# Patient Record
Sex: FEMALE | Race: WHITE | HISPANIC OR LATINO | Employment: FULL TIME | URBAN - NONMETROPOLITAN AREA
[De-identification: names, ages, dates, MRNs, and addresses within clinical notes are randomized per-mention and may not be internally consistent; named-entity substitution may affect disease eponyms.]

---

## 2019-07-16 ENCOUNTER — OFFICE VISIT (OUTPATIENT)
Dept: MEDICAL GROUP | Facility: CLINIC | Age: 24
End: 2019-07-16
Payer: COMMERCIAL

## 2019-07-16 VITALS
BODY MASS INDEX: 17.42 KG/M2 | HEIGHT: 67 IN | OXYGEN SATURATION: 97 % | WEIGHT: 111 LBS | HEART RATE: 74 BPM | SYSTOLIC BLOOD PRESSURE: 100 MMHG | TEMPERATURE: 98.2 F | DIASTOLIC BLOOD PRESSURE: 60 MMHG

## 2019-07-16 DIAGNOSIS — Z30.09 BIRTH CONTROL COUNSELING: ICD-10-CM

## 2019-07-16 DIAGNOSIS — L70.0 CYSTIC ACNE: ICD-10-CM

## 2019-07-16 DIAGNOSIS — Z00.00 ENCOUNTER FOR MEDICAL EXAMINATION TO ESTABLISH CARE: ICD-10-CM

## 2019-07-16 DIAGNOSIS — Z23 NEED FOR VACCINATION: ICD-10-CM

## 2019-07-16 PROCEDURE — 90471 IMMUNIZATION ADMIN: CPT | Performed by: PHYSICIAN ASSISTANT

## 2019-07-16 PROCEDURE — 90715 TDAP VACCINE 7 YRS/> IM: CPT | Performed by: PHYSICIAN ASSISTANT

## 2019-07-16 PROCEDURE — 99203 OFFICE O/P NEW LOW 30 MIN: CPT | Mod: 25 | Performed by: PHYSICIAN ASSISTANT

## 2019-07-16 ASSESSMENT — PATIENT HEALTH QUESTIONNAIRE - PHQ9: CLINICAL INTERPRETATION OF PHQ2 SCORE: 0

## 2019-07-16 NOTE — ASSESSMENT & PLAN NOTE
She is due for TDaP and Trumenba, but only TDaP is available at this clinic. She states she is not likely to seek Trumenba on her own as she doesn't like vaccines.

## 2019-07-16 NOTE — PROGRESS NOTES
Chief Complaint   Patient presents with   • Immunizations     per college pt needs Tdap   • Establish Care       HISTORY OF THE PRESENT ILLNESS: This is a 23 y.o. female new patient to our practice who presents today for evaluation and management of:    Encounter for medical examination to establish care  Patient has been informed by her school that she is due for vaccines so wishes to establish care today. She denies any other concerns.     Cystic acne  This is present but patient is not concerned about this.     Birth control counseling  Patinet given ideas and options for birth control including methods other than condoms that can be used in addition to condoms that can help regulate cycles, prevent acne. She states she is worried that hormones will cause mood issues and obesity. She will discuss this with her provider in [place] as this is where she obtains most of her medical care.     Need for vaccination  She is due for TDaP and Trumenba, but only TDaP is available at this clinic. She states she is not likely to seek Trumenba on her own as she doesn't like vaccines.       History reviewed. No pertinent past medical history.    History reviewed. No pertinent surgical history.    Family Status   Relation Status   • Mo Alive   • Fa Alive   • MGFa Alive   • PGFa Alive     Family History   Problem Relation Age of Onset   • Diabetes Maternal Grandfather    • Cancer Paternal Grandfather         prostate       Social History   Substance Use Topics   • Smoking status: Never Smoker   • Smokeless tobacco: Never Used   • Alcohol use Not on file      Comment: one or two drinks per year       Allergies: Patient has no known allergies.    No current Epic-ordered outpatient prescriptions on file.     No current Epic-ordered facility-administered medications on file.      Review of Systems: See HPI above.   Constitutional: Negative for fever, chills, unplanned weight change and malaise/fatigue.   HENT: Negative for ear pain or  "tinnitus, nosebleeds, congestion, sore throat and neck pain.    Eyes: Negative for blurred or decreased vision.   Respiratory: Negative for cough, sputum production, shortness of breath and wheezing.    Cardiovascular: Negative for chest pain, palpitations, orthopnea, syncope and leg swelling.   Gastrointestinal: Negative for heartburn, nausea, vomiting and abdominal pain.   Genitourinary: Negative for dysuria, urgency and frequency.   Musculoskeletal: Negative for myalgias, back pain and joint pain.   Skin: Negative for rash, itching, nail changes or skin changes. Positive for acne.   Neurological: Negative for dizziness, tremors, sensory change, focal weakness, tingling and headaches.   Endo/Heme/Allergies: Does not bruise/bleed easily.    Psychiatric/Behavioral: Negative for depression, suicidal ideas and memory loss. The patient is not nervous/anxious and does not have insomnia.  Pt does not use recreational drugs or excessive alcohol.       Exam:  /60 (BP Location: Left arm, Patient Position: Sitting, BP Cuff Size: Adult)   Pulse 74   Temp 36.8 °C (98.2 °F) (Temporal)   Ht 1.702 m (5' 7\")   Wt 50.3 kg (111 lb)   SpO2 97%   Body mass index is 17.39 kg/m².  General:  Thin female in NAD  Eyes: Conjunctiva clear, lids without ptosis  ENMT: Nose and lips without deformity.  Neck: Thyroid is not visibly enlarged.  Pulmonary: Normal effort. No rales, ronchi, or wheezing upon auscultation.   Cardiovascular: Regular rate and rhythm without murmur. Carotid and radial pulses are intact and equal bilaterally.   Extremities: No clubbing or cyanosis. Bilateral upper and lower extremities without edema.   Skin: Warm and dry.  No obvious lesions.  Musculoskeletal: Normal gait.   Psych: Normal mood and affect. Alert and oriented x3. Judgment and insight is normal.      Medical Decision Making & Discussion:   1. Need for vaccination  - Tdap =>6yo IM    2. Encounter for medical examination to establish care  I am happy " to participate in the care of this pleasant 23 year old Woman.       3. Cystic acne      4. Birth control counseling  Offered and refused this today. Also discussed need for PAP smear at the age of 23 to be completed as soon as possible. Patient states she will have this done in [place].       Please note that this dictation was created using voice recognition software. I have made every reasonable attempt to correct obvious errors, but I expect that there are errors of grammar and possibly content that I did not discover before finalizing the note.    Return for as needed. .

## 2019-07-16 NOTE — ASSESSMENT & PLAN NOTE
Patient has been informed by her school that she is due for vaccines so wishes to establish care today. She denies any other concerns.

## 2019-07-16 NOTE — ASSESSMENT & PLAN NOTE
Jeffrey given ideas and options for birth control including methods other than condoms that can be used in addition to condoms that can help regulate cycles, prevent acne. She states she is worried that hormones will cause mood issues and obesity. She will discuss this with her provider in [place] as this is where she obtains most of her medical care.

## 2019-08-22 ENCOUNTER — TELEPHONE (OUTPATIENT)
Dept: MEDICAL GROUP | Facility: CLINIC | Age: 24
End: 2019-08-22

## 2019-08-22 NOTE — TELEPHONE ENCOUNTER
Patient came in to get their immunization record today. Record was pulled from [facility] scanned into media

## 2020-07-14 ENCOUNTER — HOSPITAL ENCOUNTER (EMERGENCY)
Facility: MEDICAL CENTER | Age: 25
End: 2020-07-14
Attending: EMERGENCY MEDICINE
Payer: COMMERCIAL

## 2020-07-14 VITALS
BODY MASS INDEX: 17.27 KG/M2 | HEART RATE: 109 BPM | RESPIRATION RATE: 18 BRPM | SYSTOLIC BLOOD PRESSURE: 113 MMHG | TEMPERATURE: 99.4 F | DIASTOLIC BLOOD PRESSURE: 64 MMHG | OXYGEN SATURATION: 98 % | WEIGHT: 110 LBS | HEIGHT: 67 IN

## 2020-07-14 DIAGNOSIS — R55 VASOVAGAL SYNCOPE: ICD-10-CM

## 2020-07-14 LAB
ALBUMIN SERPL BCP-MCNC: 4.5 G/DL (ref 3.2–4.9)
ALBUMIN/GLOB SERPL: 1.4 G/DL
ALP SERPL-CCNC: 49 U/L (ref 30–99)
ALT SERPL-CCNC: 10 U/L (ref 2–50)
ANION GAP SERPL CALC-SCNC: 11 MMOL/L (ref 7–16)
AST SERPL-CCNC: 18 U/L (ref 12–45)
BASOPHILS # BLD AUTO: 0.5 % (ref 0–1.8)
BASOPHILS # BLD: 0.06 K/UL (ref 0–0.12)
BILIRUB SERPL-MCNC: 0.3 MG/DL (ref 0.1–1.5)
BUN SERPL-MCNC: 14 MG/DL (ref 8–22)
CALCIUM SERPL-MCNC: 9.3 MG/DL (ref 8.5–10.5)
CHLORIDE SERPL-SCNC: 102 MMOL/L (ref 96–112)
CO2 SERPL-SCNC: 22 MMOL/L (ref 20–33)
CREAT SERPL-MCNC: 0.65 MG/DL (ref 0.5–1.4)
D DIMER PPP IA.FEU-MCNC: 0.3 UG/ML (FEU) (ref 0–0.5)
EKG IMPRESSION: NORMAL
EOSINOPHIL # BLD AUTO: 0.1 K/UL (ref 0–0.51)
EOSINOPHIL NFR BLD: 0.8 % (ref 0–6.9)
ERYTHROCYTE [DISTWIDTH] IN BLOOD BY AUTOMATED COUNT: 39 FL (ref 35.9–50)
GLOBULIN SER CALC-MCNC: 3.3 G/DL (ref 1.9–3.5)
GLUCOSE SERPL-MCNC: 152 MG/DL (ref 65–99)
HCG SERPL QL: NEGATIVE
HCT VFR BLD AUTO: 40.8 % (ref 37–47)
HGB BLD-MCNC: 13.4 G/DL (ref 12–16)
IMM GRANULOCYTES # BLD AUTO: 0.06 K/UL (ref 0–0.11)
IMM GRANULOCYTES NFR BLD AUTO: 0.5 % (ref 0–0.9)
LYMPHOCYTES # BLD AUTO: 1.37 K/UL (ref 1–4.8)
LYMPHOCYTES NFR BLD: 10.6 % (ref 22–41)
MCH RBC QN AUTO: 26.7 PG (ref 27–33)
MCHC RBC AUTO-ENTMCNC: 32.8 G/DL (ref 33.6–35)
MCV RBC AUTO: 81.3 FL (ref 81.4–97.8)
MONOCYTES # BLD AUTO: 0.67 K/UL (ref 0–0.85)
MONOCYTES NFR BLD AUTO: 5.2 % (ref 0–13.4)
NEUTROPHILS # BLD AUTO: 10.71 K/UL (ref 2–7.15)
NEUTROPHILS NFR BLD: 82.4 % (ref 44–72)
NRBC # BLD AUTO: 0 K/UL
NRBC BLD-RTO: 0 /100 WBC
PLATELET # BLD AUTO: 255 K/UL (ref 164–446)
PMV BLD AUTO: 11.6 FL (ref 9–12.9)
POTASSIUM SERPL-SCNC: 3.7 MMOL/L (ref 3.6–5.5)
PROT SERPL-MCNC: 7.8 G/DL (ref 6–8.2)
RBC # BLD AUTO: 5.02 M/UL (ref 4.2–5.4)
SODIUM SERPL-SCNC: 135 MMOL/L (ref 135–145)
WBC # BLD AUTO: 13 K/UL (ref 4.8–10.8)

## 2020-07-14 PROCEDURE — 84703 CHORIONIC GONADOTROPIN ASSAY: CPT

## 2020-07-14 PROCEDURE — 85379 FIBRIN DEGRADATION QUANT: CPT

## 2020-07-14 PROCEDURE — 85025 COMPLETE CBC W/AUTO DIFF WBC: CPT

## 2020-07-14 PROCEDURE — 99284 EMERGENCY DEPT VISIT MOD MDM: CPT

## 2020-07-14 PROCEDURE — 93005 ELECTROCARDIOGRAM TRACING: CPT | Performed by: EMERGENCY MEDICINE

## 2020-07-14 PROCEDURE — 80053 COMPREHEN METABOLIC PANEL: CPT

## 2020-07-14 ASSESSMENT — LIFESTYLE VARIABLES: DO YOU DRINK ALCOHOL: NO

## 2020-07-14 NOTE — ED TRIAGE NOTES
Chief Complaint   Patient presents with   • Syncope   • Dizziness     Pt bib ems , pt had syncopal episode at work. Pt said that she felt dizzy prior to passing out. She also reports that had similar episode before due to dehydration.  fsbs 147, a/ox4. Denies head,neck or back pain.

## 2020-07-14 NOTE — ED NOTES
ERP to bedside to discuss test results.     Pt A&Ox4, given discharge instructions. Discussed follow-up, diet, activity, symptoms and management. Pt educated on remaining hydrated. Pt verbalized understanding of all discharge instructions. All questions answered. IV d/c'd. Pt ambulated off unit.

## 2020-07-14 NOTE — ED PROVIDER NOTES
"ED Provider Note    Scribed for Luiz Hayes M.D. by Baljinder Nickerson. 7/14/2020  4:04 PM    Primary care provider: Brenda Kumar P.A.-C.  Means of arrival: EMS  History obtained from: Patient  History limited by: None    CHIEF COMPLAINT  Chief Complaint   Patient presents with   • Syncope   • Dizziness       HPI  Deepa Mccarty is a 24 y.o. female who presents to the Emergency Department after experiencing a syncopal episode earlier today. She states that she was at work talking to a few co-workers and had been standing for multiple hours when she started to feel lightheaded. Upon this development she sat down, however shortly after started to feel nauseous, and then lost consciousness. She is unsure how long she was unconscious for. EMS was called and she was brought here for evaluation. At this time her nausea has resolved, but she verbalizes that she feels like she's \"still in a dream.\" Patient claims to be eating and drinking normally, and notes that she is currently on her menstrual period. She has previously had a similar episode of syncope which was determined to be secondary to dehydration. Patient otherwise denies having any fevers, chills, chest pain, abdominal pain, shortness of breath, nausea, vomiting.    REVIEW OF SYSTEMS  Pertinent positives include lightheadedness, nausea, syncope. Pertinent negatives include no fevers, chills, chest pain, abdominal pain, shortness of breath, nausea, vomiting.  All other systems reviewed and negative.    PAST MEDICAL HISTORY  Patient denies any past medical problems or history.    SURGICAL HISTORY  patient denies any surgical history    SOCIAL HISTORY  Social History     Tobacco Use   • Smoking status: Never Smoker   • Smokeless tobacco: Never Used   Substance Use Topics   • Alcohol use: Not on file     Comment: one or two drinks per year   • Drug use: Not on file      Social History     Substance and Sexual Activity   Drug Use Not on file       FAMILY " "HISTORY  Family History   Problem Relation Age of Onset   • Diabetes Maternal Grandfather    • Cancer Paternal Grandfather         prostate       CURRENT MEDICATIONS  Home Medications     Reviewed by Christina Don R.N. (Registered Nurse) on 07/14/20 at 1533  Med List Status: Complete   Medication Last Dose Status        Patient Anselmo Taking any Medications                       ALLERGIES  No Known Allergies    PHYSICAL EXAM  VITAL SIGNS: /66   Pulse 99   Temp 37.2 °C (99 °F) (Temporal)   Resp 20   Ht 1.702 m (5' 7\")   Wt 49.9 kg (110 lb)   SpO2 97%   BMI 17.23 kg/m²     Constitutional: Well developed, Well nourished, no apparent distress, Non-toxic appearance.   HENT: Normocephalic, Atraumatic, Bilateral external ears normal, Oropharynx moist, No oral exudates.   Eyes: PERRLA, EOMI, Conjunctiva normal, No discharge.   Neck: No tenderness, Supple, No stridor.   Lymphatic: No lymphadenopathy noted.   Cardiovascular: Normal heart rate, Normal rhythm.   Thorax & Lungs: Clear to auscultation bilaterally, No respiratory distress, No wheezing, No crackles.   Abdomen: Soft, No tenderness, No masses, No pulsatile masses.   Skin: Warm, Dry, No erythema, No rash.   Extremities:, No edema No cyanosis.   Musculoskeletal: No tenderness to palpation or major deformities noted.  Intact distal pulses  Neurologic: Awake, alert. Moves all extremities spontaneously.  Psychiatric: Slightly anxious    LABS  Results for orders placed or performed during the hospital encounter of 07/14/20   HCG Qual Serum   Result Value Ref Range    Beta-Hcg Qualitative Serum Negative Negative   CBC w/ Differential   Result Value Ref Range    WBC 13.0 (H) 4.8 - 10.8 K/uL    RBC 5.02 4.20 - 5.40 M/uL    Hemoglobin 13.4 12.0 - 16.0 g/dL    Hematocrit 40.8 37.0 - 47.0 %    MCV 81.3 (L) 81.4 - 97.8 fL    MCH 26.7 (L) 27.0 - 33.0 pg    MCHC 32.8 (L) 33.6 - 35.0 g/dL    RDW 39.0 35.9 - 50.0 fL    Platelet Count 255 164 - 446 K/uL    MPV 11.6 9.0 " - 12.9 fL    Neutrophils-Polys 82.40 (H) 44.00 - 72.00 %    Lymphocytes 10.60 (L) 22.00 - 41.00 %    Monocytes 5.20 0.00 - 13.40 %    Eosinophils 0.80 0.00 - 6.90 %    Basophils 0.50 0.00 - 1.80 %    Immature Granulocytes 0.50 0.00 - 0.90 %    Nucleated RBC 0.00 /100 WBC    Neutrophils (Absolute) 10.71 (H) 2.00 - 7.15 K/uL    Lymphs (Absolute) 1.37 1.00 - 4.80 K/uL    Monos (Absolute) 0.67 0.00 - 0.85 K/uL    Eos (Absolute) 0.10 0.00 - 0.51 K/uL    Baso (Absolute) 0.06 0.00 - 0.12 K/uL    Immature Granulocytes (abs) 0.06 0.00 - 0.11 K/uL    NRBC (Absolute) 0.00 K/uL   Complete Metabolic Panel (CMP)   Result Value Ref Range    Sodium 135 135 - 145 mmol/L    Potassium 3.7 3.6 - 5.5 mmol/L    Chloride 102 96 - 112 mmol/L    Co2 22 20 - 33 mmol/L    Anion Gap 11.0 7.0 - 16.0    Glucose 152 (H) 65 - 99 mg/dL    Bun 14 8 - 22 mg/dL    Creatinine 0.65 0.50 - 1.40 mg/dL    Calcium 9.3 8.5 - 10.5 mg/dL    AST(SGOT) 18 12 - 45 U/L    ALT(SGPT) 10 2 - 50 U/L    Alkaline Phosphatase 49 30 - 99 U/L    Total Bilirubin 0.3 0.1 - 1.5 mg/dL    Albumin 4.5 3.2 - 4.9 g/dL    Total Protein 7.8 6.0 - 8.2 g/dL    Globulin 3.3 1.9 - 3.5 g/dL    A-G Ratio 1.4 g/dL   D-Dimer (only helpful in low pre-test probability wells critieria. Do not order if patient ruled out by PERC criteria. See Weblinks at top of Labs section)   Result Value Ref Range    D-Dimer Screen 0.30 0.00 - 0.50 ug/mL (FEU)   ESTIMATED GFR   Result Value Ref Range    GFR If African American >60 >60 mL/min/1.73 m 2    GFR If Non African American >60 >60 mL/min/1.73 m 2   EKG   Result Value Ref Range    Report       [facility] Emergency Dept.    Test Date:  2020  Pt Name:    CIRILO HAMMER              Department: ER  MRN:        [id number]                      Room:        22  Gender:     Female                       Technician: [id number]  :        1995                   Requested By:ER TRIAGE PROTOCOL  Order #:    [id number]                     Reading MD: AUSTIN BERNABE MD    Measurements  Intervals                                Axis  Rate:       96                           P:          76  TN:         144                          QRS:        67  QRSD:       78                           T:          39  QT:         360  QTc:        455    Interpretive Statements  SINUS RHYTHM  RIGHT ATRIAL ABNORMALITY  No previous ECG available for comparison  Electronically Signed On 7- 16:39:48 PDT by AUSTIN BERNABE MD          RADIOLOGY  No orders to display     The radiologist's interpretation of all radiological studies have been reviewed by me.      COURSE & MEDICAL DECISION MAKING  Pertinent Labs & Imaging studies reviewed. (See chart for details)    4:04 PM - Patient seen and examined at bedside. Ordered CBC with differential, CMP, D-Dimer, HCG Qual Serum, Estimated GFR to evaluate her symptoms. The differential diagnoses include but are not limited to: vasovagal syncope, PE. Discussed with the patietn that her EKG is reassuring, and she likely experienced and episode of vasovagal syncope, however because her heart rate was significantly elevated when she arrived I have to screen her for a blood clot as a possible cause of her syncope. She will be informed of the results when they return. Patient understands and agrees to the plan of care.    4:56 PM - Discussed with the patient that her blood clot screening was negative and thus she is stable for discharge. Answered any questions or concerns the patient had. She understands and agrees to discharge.    Decision Making:  Patient with a syncopal episode, likely vasovagal in nature, the patient was ruled out for a PE by negative d-dimer, EKG was negative, believe the patient can be discharged home, follow-up with the primary care physician, have the patient return with any other concerns.    The patient will return for new or worsening symptoms and is stable at the time of  discharge.    DISPOSITION:  Patient will be discharged home in stable condition.    FOLLOW UP:  [facility], Emergency Dept  [address] [facility] [address] [phone]    If symptoms worsen    Brenda Kumar P.A.-C.  [address]  [facility] [id number] [phone]            OUTPATIENT MEDICATIONS:  There are no discharge medications for this patient.        FINAL IMPRESSION  1. Vasovagal syncope          IBaljinder (Scribe), am scribing for, and in the presence of, Luiz Hayes M.D..    Electronically signed by: Baljinder Nickerson (Pavanibe), 7/14/2020    Luiz PANDYA M.D. personally performed the services described in this documentation, as scribed by Baljinder Nickerson in my presence, and it is both accurate and complete. C.    The note accurately reflects work and decisions made by me.  Luiz Hayes M.D.  7/14/2020  6:12 PM

## 2020-09-29 ENCOUNTER — OFFICE VISIT (OUTPATIENT)
Dept: URGENT CARE | Facility: PHYSICIAN GROUP | Age: 25
End: 2020-09-29
Payer: COMMERCIAL

## 2020-09-29 ENCOUNTER — HOSPITAL ENCOUNTER (OUTPATIENT)
Facility: MEDICAL CENTER | Age: 25
End: 2020-09-29
Attending: PHYSICIAN ASSISTANT
Payer: COMMERCIAL

## 2020-09-29 VITALS
BODY MASS INDEX: 17.11 KG/M2 | HEART RATE: 100 BPM | DIASTOLIC BLOOD PRESSURE: 64 MMHG | RESPIRATION RATE: 16 BRPM | HEIGHT: 67 IN | WEIGHT: 109 LBS | SYSTOLIC BLOOD PRESSURE: 102 MMHG | OXYGEN SATURATION: 99 % | TEMPERATURE: 98.3 F

## 2020-09-29 DIAGNOSIS — R50.9 FEVER, UNSPECIFIED FEVER CAUSE: ICD-10-CM

## 2020-09-29 DIAGNOSIS — R21 RASH: ICD-10-CM

## 2020-09-29 LAB
COVID ORDER STATUS COVID19: NORMAL
FLUAV+FLUBV AG SPEC QL IA: NEGATIVE
INT CON NEG: NORMAL
INT CON POS: NORMAL

## 2020-09-29 PROCEDURE — 87804 INFLUENZA ASSAY W/OPTIC: CPT | Mod: CS | Performed by: PHYSICIAN ASSISTANT

## 2020-09-29 PROCEDURE — U0003 INFECTIOUS AGENT DETECTION BY NUCLEIC ACID (DNA OR RNA); SEVERE ACUTE RESPIRATORY SYNDROME CORONAVIRUS 2 (SARS-COV-2) (CORONAVIRUS DISEASE [COVID-19]), AMPLIFIED PROBE TECHNIQUE, MAKING USE OF HIGH THROUGHPUT TECHNOLOGIES AS DESCRIBED BY CMS-2020-01-R: HCPCS

## 2020-09-29 PROCEDURE — 99204 OFFICE O/P NEW MOD 45 MIN: CPT | Mod: CS | Performed by: PHYSICIAN ASSISTANT

## 2020-09-29 ASSESSMENT — FIBROSIS 4 INDEX: FIB4 SCORE: 0.51

## 2020-09-29 NOTE — LETTER
September 29, 2020         Patient: Deepa Mccarty   YOB: 1995   Date of Visit: 9/29/2020           To Whom it May Concern:    Deepa Mccarty was seen in my clinic on 9/29/2020.     A concern for COVID-19 has been identified and testing is in progress.  We are asking you to excuse absences while following self-isolation protocol per Center for Disease Control (CDC) guidelines.  Your employee will be able to access test results through our electronic delivery system called [employer].    If test results are negative, and once there has been no fever (temperature greater than 100.4 °F) for at least 24 hours without treatment, and no vomiting or diarrhea for at least 48 hours, then returned to work as approved.    If test results are positive then your employee will be contacted by the [facility] or [facility] for further instructions on duration of self-isolation and return to work protocol.  In general, this will also follow the CDC guidelines with a minimum of 10 days from the onset of symptoms and without fever, vomiting, or diarrhea as above.    In general, repeat testing is not necessary and not offered through our [facility].    This is the only note that will be provided from [facility] for this visit.  Your employee will require an appointment with a primary care provider if FMLA or disability forms are required.      If you have any questions or concerns, please don't hesitate to call.        Sincerely,           Carol Deng P.A.-C.  Electronically Signed

## 2020-09-29 NOTE — LETTER
September 29, 2020     Deepa Mccarty  [address]  [facility] [address]      Dear Deepa:    Thank you for enrolling in [employer]. Please follow the instructions below to securely access your online medical record. [employer] allows you to send messages to your healthcare team, view certain test results, renew your prescriptions, schedule appointments, and more.     How Do I Sign Up?  1. In your Internet browser, go to  [email or web address]  2. Click on the Enter Code link in the Sign In box. You will see the New Member Sign Up page.  3. Enter your [employer] Access Code exactly as it appears below (case sensitive). You will not need to use this code after you’ve completed the sign-up process. If you do not sign up before the expiration date, you must request a new code.  [employer] Access Code: [email or web address]  Expires: 10/18/2020  5:15 AM    4. Enter your Email address and Date of Birth (mm/dd/yyyy) as indicated and click Submit. You will be taken to the next sign-up page.  5. Create a [employer] ID (case sensitive) . This will be your [employer] login ID and cannot be changed, so think of one that is secure and easy to remember.  6. Create a [employer] password  (case sensitive).   · Your password must be a length of at least 6 characters/digits.  · It must include at least 1 numeric.  · You can change your password at any time.  7. Enter your Password Reset Question and Answer. This can be used at a later time if you forget your password.   8. Enter your e-mail address. You will receive e-mail notification when new information is available in [employer].  9. Click Sign Up. You can now view your medical record.     Additional Information  Please contact [employer] Customer Support at [phone] for any questions . Remember, [employer] is NOT to be used for urgent needs. For medical emergencies, dial 911.          Introducing [employer]    [facility]’s Secure, Online Health Connection      [facility] now  offers convenient, online access to your healthcare team and personal health information.  [employer] makes managing your healthcare easier than ever, and allows you to:  • Email your healthcare provider securely and privately  • Access your test results  • Request prescription refills 24 hours a day  • View your personal medical records from home  • Schedule or change your appointments  • View your Insurance and Billing Information  • Pay bills online ? Coming soon!        Sign below to get started:  I hereby request access to [employer] application.  I agree to abide by the [employer] Terms and Conditions, which will be provided to me upon activating my account.       __________________________________        _________________________  Name (Please Print)          Date of Birth     __________________________________       _________________________  Signature          Primary Care Provider      _______________  Date                          *For Internal Use Only: Please scan this form into the patient’s chart. Click on  - Select Patient - Attach to Encounter:  - Document Type: Consent   - Document Description: MyChart Consent

## 2020-09-29 NOTE — PROGRESS NOTES
Chief Complaint   Patient presents with   • Rash     full body rash, fever        HISTORY OF PRESENT ILLNESS: Patient is a 25 y.o. female who presents today for the following:    Fever x 3 days; tmax 101.5  + chills, body aches  Denies coughing, SOB, ST, ear pain, nasal congestion  + HA  Denies urinary symptoms  LMP: 9/11/2020  No known COVID exposure; [employer]  OTC meds today: none  Noticed a rash this morning on her trunk, denies itching and pain  Denies urinary symptoms, neck stiffness, eye symptoms, sore throat    Patient Active Problem List    Diagnosis Date Noted   • Encounter for medical examination to establish care 07/16/2019   • Cystic acne 07/16/2019   • Birth control counseling 07/16/2019   • Need for vaccination 07/16/2019       Allergies:Patient has no known allergies.    No current [facility]-ordered outpatient medications on file.     No current [facility]-ordered facility-administered medications on file.        History reviewed. No pertinent past medical history.    Social History     Tobacco Use   • Smoking status: Never Smoker   • Smokeless tobacco: Never Used   Substance Use Topics   • Alcohol use: Not on file     Comment: one or two drinks per year   • Drug use: Not on file       Family Status   Relation Name Status   • Mo  Alive   • Fa  Alive   • MGFa  Alive   • PGFa  Alive     Family History   Problem Relation Age of Onset   • Diabetes Maternal Grandfather    • Cancer Paternal Grandfather         prostate       Review of Systems:    Constitutional ROS: No unexpected change in weight, No weakness, No fatigue  Eye ROS: No recent significant change in vision, No eye pain, redness, discharge  Ear ROS: No drainage, No tinnitus or vertigo, No recent change in hearing  Mouth/Throat ROS: No teeth or gum problems, No bleeding gums, No tongue complaints  Neck ROS: No swollen glands, No significant pain in neck  Pulmonary ROS: No chronic cough, sputum, or hemoptysis, No dyspnea on exertion, No  "wheezing  Cardiovascular ROS: No diaphoresis, No edema, No palpitations  Musculoskeletal/Extremities ROS: No peripheral edema, No pain, redness or swelling on the joints  Hematologic/Lymphatic ROS: + fever, chills, body aches  Skin/Integumentary ROS: No edema, No evidence of rash, No itching      Exam:  /64   Pulse 100   Temp 36.8 °C (98.3 °F)   Resp 16   Ht 1.702 m (5' 7\")   Wt 49.4 kg (109 lb)   SpO2 99%   General: Well developed, well nourished. No distress.    Eye: PERRL/EOMI; conjunctivae clear, lids normal.  ENMT: Lips without lesions, MMM. Oropharynx is clear. Bilateral TMs are within normal limits.  Pulmonary: Unlabored respiratory effort. Lungs clear to auscultation, no wheezes, no rhonchi.    Cardiovascular: Regular rate and rhythm without murmur.   Neurologic: Grossly nonfocal. No facial asymmetry noted.  Lymph: No cervical lymphadenopathy noted.  Skin: Rash as noted on the imported image below, noted diffusely on the trunk and scantly on the extremities.  Lesions deny with pressure and are nontender to palpation.  Psych: Normal mood. Alert and oriented to person, place and time.          Rapid influenza:  Negative    Assessment/Plan:  Discussed likely viral etiology.  Vitals unremarkable.  Low suspicion for pneumonia.  Patient be tested for COVID and will quarantine until results are available.  Discussed appropriate over-the-counter symptomatic medication, and when to return to clinic. Follow up for worsening or persistent symptoms.  1. Fever, unspecified fever cause  POCT Influenza A/B    COVID/SARS COV-2 PCR   2. Rash      ? Viral exanthem       "

## 2020-09-30 LAB
SARS-COV-2 RNA RESP QL NAA+PROBE: NOTDETECTED
SPECIMEN SOURCE: NORMAL